# Patient Record
Sex: FEMALE | Race: WHITE | NOT HISPANIC OR LATINO | URBAN - METROPOLITAN AREA
[De-identification: names, ages, dates, MRNs, and addresses within clinical notes are randomized per-mention and may not be internally consistent; named-entity substitution may affect disease eponyms.]

---

## 2019-12-21 ENCOUNTER — EMERGENCY (EMERGENCY)
Facility: HOSPITAL | Age: 29
LOS: 1 days | Discharge: HOME | End: 2019-12-21
Attending: EMERGENCY MEDICINE
Payer: MEDICAID

## 2019-12-21 VITALS
HEIGHT: 65 IN | HEART RATE: 83 BPM | WEIGHT: 130.07 LBS | TEMPERATURE: 98 F | SYSTOLIC BLOOD PRESSURE: 115 MMHG | OXYGEN SATURATION: 98 % | RESPIRATION RATE: 20 BRPM | DIASTOLIC BLOOD PRESSURE: 74 MMHG

## 2019-12-21 PROCEDURE — 99282 EMERGENCY DEPT VISIT SF MDM: CPT

## 2019-12-21 NOTE — ED ADULT NURSE NOTE - CHIEF COMPLAINT QUOTE
Swelling, burning sensation to vaginal area. Pt gave birth x 12/16. Had Episiotomy. Still has vag bleed

## 2019-12-21 NOTE — ED ADULT TRIAGE NOTE - CHIEF COMPLAINT QUOTE
Swelling, burning sensation to vaginal area. Pt gave birth x 12/16. Still has vag bleed Swelling, burning sensation to vaginal area. Pt gave birth x 12/16. Had Episiotomy. Still has vag bleed

## 2019-12-21 NOTE — ED PROVIDER NOTE - OBJECTIVE STATEMENT
28 y/o F patient with no significant PMHx and no significant PSHx presents to the ED with vaginal bleeding. Patient reports she underwent a  x6 days ago at [facility]. Patient says she is now experiencing vaginal bleeding and a burning sensation to the area. Patient adds her stitches came out of the incision site and she now has pain to the area. Patient also endorses swelling to the area and describes her vaginal bleeding similar to her normal menstrual cycle. Patient endorses some headache and states her vaginal blood has a foul odor. Patient notes she has pain only in the vaginal area. Patient denies nausea, vomiting, lightheadedness, fever, and any other complaints. NKDA.

## 2019-12-21 NOTE — ED PROVIDER NOTE - PROGRESS NOTE DETAILS
Patient evaluated by OB-GYN team, healing appropriately, no concern with GYN and recommends discharged. Patient in agreement with plan. Patient discharged.